# Patient Record
Sex: MALE | Race: BLACK OR AFRICAN AMERICAN | NOT HISPANIC OR LATINO | Employment: FULL TIME | URBAN - METROPOLITAN AREA
[De-identification: names, ages, dates, MRNs, and addresses within clinical notes are randomized per-mention and may not be internally consistent; named-entity substitution may affect disease eponyms.]

---

## 2024-02-05 ENCOUNTER — APPOINTMENT (OUTPATIENT)
Dept: UROLOGY | Facility: HOSPITAL | Age: 44
End: 2024-02-05
Payer: COMMERCIAL

## 2025-01-08 DIAGNOSIS — R97.20 ELEVATED PSA: Primary | ICD-10-CM

## 2025-01-29 ENCOUNTER — HOSPITAL ENCOUNTER (OUTPATIENT)
Dept: RADIOLOGY | Facility: HOSPITAL | Age: 45
Discharge: HOME | End: 2025-01-29
Payer: COMMERCIAL

## 2025-01-29 VITALS — BODY MASS INDEX: 29.27 KG/M2 | WEIGHT: 228 LBS

## 2025-01-29 DIAGNOSIS — R97.20 ELEVATED PSA: ICD-10-CM

## 2025-01-29 PROCEDURE — 72197 MRI PELVIS W/O & W/DYE: CPT | Performed by: RADIOLOGY

## 2025-01-29 PROCEDURE — A9575 INJ GADOTERATE MEGLUMI 0.1ML: HCPCS | Performed by: STUDENT IN AN ORGANIZED HEALTH CARE EDUCATION/TRAINING PROGRAM

## 2025-01-29 PROCEDURE — 2550000001 HC RX 255 CONTRASTS: Performed by: STUDENT IN AN ORGANIZED HEALTH CARE EDUCATION/TRAINING PROGRAM

## 2025-01-29 PROCEDURE — 72197 MRI PELVIS W/O & W/DYE: CPT

## 2025-01-29 PROCEDURE — 76498 UNLISTED MR PROCEDURE: CPT

## 2025-01-29 RX ORDER — GADOTERATE MEGLUMINE 376.9 MG/ML
20 INJECTION INTRAVENOUS
Status: COMPLETED | OUTPATIENT
Start: 2025-01-29 | End: 2025-01-29

## 2025-01-29 RX ADMIN — GADOTERATE MEGLUMINE 20 ML: 376.9 INJECTION INTRAVENOUS at 09:21

## 2025-04-21 ENCOUNTER — OFFICE VISIT (OUTPATIENT)
Dept: URGENT CARE | Age: 45
End: 2025-04-21
Payer: COMMERCIAL

## 2025-04-21 ENCOUNTER — ANCILLARY PROCEDURE (OUTPATIENT)
Dept: URGENT CARE | Age: 45
End: 2025-04-21
Payer: COMMERCIAL

## 2025-04-21 VITALS
HEIGHT: 74 IN | RESPIRATION RATE: 16 BRPM | TEMPERATURE: 97.7 F | BODY MASS INDEX: 29.52 KG/M2 | HEART RATE: 70 BPM | SYSTOLIC BLOOD PRESSURE: 128 MMHG | DIASTOLIC BLOOD PRESSURE: 82 MMHG | WEIGHT: 230 LBS | OXYGEN SATURATION: 99 %

## 2025-04-21 DIAGNOSIS — M54.16 LUMBAR RADICULOPATHY: Primary | ICD-10-CM

## 2025-04-21 DIAGNOSIS — M54.16 LUMBAR RADICULOPATHY: ICD-10-CM

## 2025-04-21 PROBLEM — H11.003 PTERYGIUM OF BOTH EYES: Status: ACTIVE | Noted: 2023-06-09

## 2025-04-21 PROBLEM — H52.13 MYOPIA OF BOTH EYES: Status: ACTIVE | Noted: 2023-06-09

## 2025-04-21 PROBLEM — Z98.52 VASECTOMY STATUS: Status: ACTIVE | Noted: 2025-04-21

## 2025-04-21 PROCEDURE — 99204 OFFICE O/P NEW MOD 45 MIN: CPT | Performed by: NURSE PRACTITIONER

## 2025-04-21 PROCEDURE — 72100 X-RAY EXAM L-S SPINE 2/3 VWS: CPT | Performed by: NURSE PRACTITIONER

## 2025-04-21 PROCEDURE — 3008F BODY MASS INDEX DOCD: CPT | Performed by: NURSE PRACTITIONER

## 2025-04-21 RX ORDER — TIZANIDINE 4 MG/1
4 TABLET ORAL EVERY 6 HOURS PRN
Qty: 30 TABLET | Refills: 0 | Status: SHIPPED | OUTPATIENT
Start: 2025-04-21 | End: 2025-05-01

## 2025-04-21 RX ORDER — IBUPROFEN 800 MG/1
800 TABLET ORAL EVERY 8 HOURS PRN
Qty: 30 TABLET | Refills: 0 | Status: SHIPPED | OUTPATIENT
Start: 2025-04-21 | End: 2025-05-01

## 2025-04-21 ASSESSMENT — ENCOUNTER SYMPTOMS
FATIGUE: 0
DIZZINESS: 0
MYALGIAS: 0
APPETITE CHANGE: 0
BACK PAIN: 1
FEVER: 0
SHORTNESS OF BREATH: 0
ABDOMINAL PAIN: 0
ACTIVITY CHANGE: 0
COUGH: 0
ARTHRALGIAS: 1
CHILLS: 0
HEADACHES: 0

## 2025-04-21 ASSESSMENT — PAIN SCALES - GENERAL: PAINLEVEL_OUTOF10: 7

## 2025-04-21 NOTE — PROGRESS NOTES
"Subjective   Patient ID: Junior Kenn Marshall is a 44 y.o. male. They present today with a chief complaint of Back Pain (C/O back pain since Saturday, no trauma. ).    History of Present Illness    History provided by:  Patient      Patient states he back pain since yesterday morning.  He reports no known trauma or injury to the neck.  He states that he was working out Thursday and started to gradually have pain after that.  He has not worked out in a while and was squatting with weights.  Patient denies any bowel or bladder issues.  Patient denies any previous treatment for his low back.  Pain is positional.  He has tried ibuprofen with minimal improvement    Past Medical History  Allergies as of 04/21/2025    (No Known Allergies)       Prescriptions Prior to Admission[1]     Medical History[2]    Surgical History[3]         Review of Systems  Review of Systems   Constitutional:  Negative for activity change, appetite change, chills, fatigue and fever.   Respiratory:  Negative for cough and shortness of breath.    Cardiovascular:  Negative for chest pain.   Gastrointestinal:  Negative for abdominal pain.   Musculoskeletal:  Positive for arthralgias and back pain. Negative for gait problem and myalgias.   Skin:  Negative for rash.   Neurological:  Negative for dizziness and headaches.                                  Objective    Vitals:    04/21/25 0837   BP: 128/82   BP Location: Left arm   Patient Position: Standing   BP Cuff Size: Adult   Pulse: 70   Resp: 16   Temp: 36.5 °C (97.7 °F)   TempSrc: Oral   SpO2: 99%   Weight: 104 kg (230 lb)   Height: 1.88 m (6' 2\")     No LMP for male patient.    Physical Exam  Vitals reviewed.   Constitutional:       General: He is not in acute distress.  Musculoskeletal:      Cervical back: Normal.      Thoracic back: Normal.      Lumbar back: Spasms and tenderness (right side) present. No swelling, edema, deformity, signs of trauma or bony tenderness. Normal range of motion. "   Neurological:      General: No focal deficit present.      Motor: No weakness.      Gait: Gait normal.         Procedures    Point of Care Test & Imaging Results from this visit  No results found for this visit on 04/21/25.   Imaging  XR lumbar spine 2-3 views  Result Date: 4/21/2025  No acute fracture or malalignment.     MACRO: None   Signed by: Gracie Little 4/21/2025 9:42 AM Dictation workstation:   [id number]      Cardiology, Vascular, and Other Imaging  No other imaging results found for the past 2 days      Diagnostic study results (if any) were reviewed by MARLEY Morocho.    Assessment/Plan   Allergies, medications, history, and pertinent labs/EKGs/Imaging reviewed by MARLEY Morocho.     Medical Decision Making  Primary Diagnosis: Mechanical low back pain (e.g., muscle strain, ligament sprain). Other Considerations: Herniated disc/radiculopathy, spinal stenosis, vertebral compression fracture, infection (e.g., vertebral osteomyelitis, discitis), malignancy (e.g., metastatic disease), inflammatory arthritis (e.g., ankylosing spondylitis).  Supporting Information for Differential Diagnosis: Findings Suggestive of Primary Diagnosis: lumbar strain. Pain localized to the lower back without radiation, no neurological deficits (e.g., no weakness, numbness, or tingling). Pain worsened with movement and relieved by rest. Findings to Rule Out Red Flags: No history of trauma, fever, or recent infection. No bowel or bladder dysfunction. No history of cancer, weight loss, or night pain. Normal neurological exam, including strength, reflexes, and sensation.  Red Flags Discussed with the Patient: New or worsening neurological symptoms (e.g., leg weakness, numbness, tingling). Loss of bowel or bladder control (concern for cauda equina syndrome). Fever, chills, or unexplained weight loss (suggestive of infection or malignancy). Severe pain unresponsive to medications or progressively  worsening. History of trauma or osteoporosis (risk for fractures). Patient advised to seek immediate care if any of the above symptoms develop.  Plan:  Diagnostic Workup negative, voicemail left as patient requested. Treatment Provided in Clinic: pain management: Acetaminophen or NSAIDs, muscle relaxants if muscle spasm suspected, heat or ice application instructions, physical activity as tolerated; avoid prolonged bed rest.  Patient Education: reassurance regarding the benign nature of most back pain, emphasize maintaining mobility and core strengthening. Follow-up if symptoms persist or worsen.  Disposition:  Discharged with instructions to monitor for red flags.  Follow-up with primary care or physical therapy if no improvement.      Orders and Diagnoses  Diagnoses and all orders for this visit:  Lumbar radiculopathy  -     XR lumbar spine 2-3 views; Future  -     ibuprofen 800 mg tablet; Take 1 tablet (800 mg) by mouth every 8 hours if needed for mild pain (1 - 3) or moderate pain (4 - 6) for up to 10 days.  -     tiZANidine (Zanaflex) 4 mg tablet; Take 1 tablet (4 mg) by mouth every 6 hours if needed for muscle spasms for up to 10 days.      Medical Admin Record      Patient disposition: Home    Electronically signed by MARLEY Morocho  9:53 AM           [1] (Not in a hospital admission)   [2] History reviewed. No pertinent past medical history.  [3] History reviewed. No pertinent surgical history.